# Patient Record
Sex: FEMALE | Employment: OTHER | URBAN - METROPOLITAN AREA
[De-identification: names, ages, dates, MRNs, and addresses within clinical notes are randomized per-mention and may not be internally consistent; named-entity substitution may affect disease eponyms.]

---

## 2024-09-06 ENCOUNTER — APPOINTMENT (OUTPATIENT)
Dept: OBSTETRICS AND GYNECOLOGY | Facility: HOSPITAL | Age: 59
End: 2024-09-06
Payer: MEDICARE

## 2024-10-16 ENCOUNTER — HOSPITAL ENCOUNTER (OUTPATIENT)
Facility: HOSPITAL | Age: 59
Setting detail: OBSERVATION
Discharge: OTHER INSTITUTION | End: 2024-10-17
Attending: EMERGENCY MEDICINE | Admitting: EMERGENCY MEDICINE
Payer: MEDICARE

## 2024-10-16 ENCOUNTER — APPOINTMENT (OUTPATIENT)
Dept: RADIOLOGY | Facility: HOSPITAL | Age: 59
End: 2024-10-16
Payer: MEDICARE

## 2024-10-16 ENCOUNTER — CLINICAL SUPPORT (OUTPATIENT)
Dept: EMERGENCY MEDICINE | Facility: HOSPITAL | Age: 59
End: 2024-10-16
Payer: MEDICARE

## 2024-10-16 DIAGNOSIS — R44.3 HALLUCINATION: Primary | ICD-10-CM

## 2024-10-16 DIAGNOSIS — F91.9 BEHAVIOR DISTURBANCE: ICD-10-CM

## 2024-10-16 PROBLEM — F20.9 SCHIZOPHRENIA: Status: ACTIVE | Noted: 2024-10-16

## 2024-10-16 LAB
ALBUMIN SERPL BCP-MCNC: 4.5 G/DL (ref 3.4–5)
ALP SERPL-CCNC: 105 U/L (ref 33–136)
ALT SERPL W P-5'-P-CCNC: 23 U/L (ref 7–45)
AMPHETAMINES UR QL SCN: NORMAL
ANION GAP SERPL CALC-SCNC: 14 MMOL/L (ref 10–20)
APAP SERPL-MCNC: <10 UG/ML
APPEARANCE UR: CLEAR
AST SERPL W P-5'-P-CCNC: 18 U/L (ref 9–39)
BARBITURATES UR QL SCN: NORMAL
BASOPHILS # BLD AUTO: 0.06 X10*3/UL (ref 0–0.1)
BASOPHILS NFR BLD AUTO: 0.8 %
BENZODIAZ UR QL SCN: NORMAL
BILIRUB SERPL-MCNC: 0.5 MG/DL (ref 0–1.2)
BILIRUB UR STRIP.AUTO-MCNC: NEGATIVE MG/DL
BUN SERPL-MCNC: 12 MG/DL (ref 6–23)
BZE UR QL SCN: NORMAL
CALCIUM SERPL-MCNC: 9.5 MG/DL (ref 8.6–10.6)
CANNABINOIDS UR QL SCN: NORMAL
CHLORIDE SERPL-SCNC: 107 MMOL/L (ref 98–107)
CO2 SERPL-SCNC: 25 MMOL/L (ref 21–32)
COLOR UR: ABNORMAL
CREAT SERPL-MCNC: 0.62 MG/DL (ref 0.5–1.05)
EGFRCR SERPLBLD CKD-EPI 2021: >90 ML/MIN/1.73M*2
EOSINOPHIL # BLD AUTO: 0.16 X10*3/UL (ref 0–0.7)
EOSINOPHIL NFR BLD AUTO: 2.2 %
ERYTHROCYTE [DISTWIDTH] IN BLOOD BY AUTOMATED COUNT: 12.6 % (ref 11.5–14.5)
ETHANOL SERPL-MCNC: <10 MG/DL
FENTANYL+NORFENTANYL UR QL SCN: NORMAL
GLUCOSE BLD MANUAL STRIP-MCNC: 169 MG/DL (ref 74–99)
GLUCOSE SERPL-MCNC: 93 MG/DL (ref 74–99)
GLUCOSE UR STRIP.AUTO-MCNC: NORMAL MG/DL
HCT VFR BLD AUTO: 43.2 % (ref 36–46)
HGB BLD-MCNC: 13.5 G/DL (ref 12–16)
HOLD SPECIMEN: NORMAL
HYALINE CASTS #/AREA URNS AUTO: ABNORMAL /LPF
IMM GRANULOCYTES # BLD AUTO: 0.02 X10*3/UL (ref 0–0.7)
IMM GRANULOCYTES NFR BLD AUTO: 0.3 % (ref 0–0.9)
KETONES UR STRIP.AUTO-MCNC: NEGATIVE MG/DL
LEUKOCYTE ESTERASE UR QL STRIP.AUTO: ABNORMAL
LITHIUM SERPL-SCNC: 0.51 MMOL/L (ref 0.6–1.2)
LYMPHOCYTES # BLD AUTO: 1.45 X10*3/UL (ref 1.2–4.8)
LYMPHOCYTES NFR BLD AUTO: 20.2 %
MAGNESIUM SERPL-MCNC: 1.79 MG/DL (ref 1.6–2.4)
MCH RBC QN AUTO: 29.5 PG (ref 26–34)
MCHC RBC AUTO-ENTMCNC: 31.3 G/DL (ref 32–36)
MCV RBC AUTO: 95 FL (ref 80–100)
METHADONE UR QL SCN: NORMAL
MONOCYTES # BLD AUTO: 0.44 X10*3/UL (ref 0.1–1)
MONOCYTES NFR BLD AUTO: 6.1 %
MUCOUS THREADS #/AREA URNS AUTO: ABNORMAL /LPF
NEUTROPHILS # BLD AUTO: 5.05 X10*3/UL (ref 1.2–7.7)
NEUTROPHILS NFR BLD AUTO: 70.4 %
NITRITE UR QL STRIP.AUTO: NEGATIVE
NRBC BLD-RTO: 0 /100 WBCS (ref 0–0)
OPIATES UR QL SCN: NORMAL
OXYCODONE+OXYMORPHONE UR QL SCN: NORMAL
PCP UR QL SCN: NORMAL
PH UR STRIP.AUTO: 5.5 [PH]
PLATELET # BLD AUTO: 165 X10*3/UL (ref 150–450)
POTASSIUM SERPL-SCNC: 3.7 MMOL/L (ref 3.5–5.3)
PROT SERPL-MCNC: 6.8 G/DL (ref 6.4–8.2)
PROT UR STRIP.AUTO-MCNC: NEGATIVE MG/DL
RBC # BLD AUTO: 4.57 X10*6/UL (ref 4–5.2)
RBC # UR STRIP.AUTO: NEGATIVE /UL
RBC #/AREA URNS AUTO: ABNORMAL /HPF
SALICYLATES SERPL-MCNC: <3 MG/DL
SODIUM SERPL-SCNC: 142 MMOL/L (ref 136–145)
SP GR UR STRIP.AUTO: 1.02
SQUAMOUS #/AREA URNS AUTO: ABNORMAL /HPF
TSH SERPL-ACNC: 3.6 MIU/L (ref 0.44–3.98)
UROBILINOGEN UR STRIP.AUTO-MCNC: NORMAL MG/DL
WBC # BLD AUTO: 7.2 X10*3/UL (ref 4.4–11.3)
WBC #/AREA URNS AUTO: ABNORMAL /HPF

## 2024-10-16 PROCEDURE — 36415 COLL VENOUS BLD VENIPUNCTURE: CPT | Performed by: NURSE PRACTITIONER

## 2024-10-16 PROCEDURE — 83735 ASSAY OF MAGNESIUM: CPT | Performed by: NURSE PRACTITIONER

## 2024-10-16 PROCEDURE — 87086 URINE CULTURE/COLONY COUNT: CPT | Performed by: NURSE PRACTITIONER

## 2024-10-16 PROCEDURE — 80307 DRUG TEST PRSMV CHEM ANLYZR: CPT | Performed by: NURSE PRACTITIONER

## 2024-10-16 PROCEDURE — G0378 HOSPITAL OBSERVATION PER HR: HCPCS

## 2024-10-16 PROCEDURE — 93005 ELECTROCARDIOGRAM TRACING: CPT

## 2024-10-16 PROCEDURE — 93010 ELECTROCARDIOGRAM REPORT: CPT

## 2024-10-16 PROCEDURE — 99285 EMERGENCY DEPT VISIT HI MDM: CPT | Performed by: NURSE PRACTITIONER

## 2024-10-16 PROCEDURE — 71046 X-RAY EXAM CHEST 2 VIEWS: CPT

## 2024-10-16 PROCEDURE — 2500000002 HC RX 250 W HCPCS SELF ADMINISTERED DRUGS (ALT 637 FOR MEDICARE OP, ALT 636 FOR OP/ED): Performed by: NURSE PRACTITIONER

## 2024-10-16 PROCEDURE — 80053 COMPREHEN METABOLIC PANEL: CPT | Performed by: NURSE PRACTITIONER

## 2024-10-16 PROCEDURE — 84443 ASSAY THYROID STIM HORMONE: CPT | Performed by: NURSE PRACTITIONER

## 2024-10-16 PROCEDURE — 85025 COMPLETE CBC W/AUTO DIFF WBC: CPT | Performed by: NURSE PRACTITIONER

## 2024-10-16 PROCEDURE — 82947 ASSAY GLUCOSE BLOOD QUANT: CPT

## 2024-10-16 PROCEDURE — 80178 ASSAY OF LITHIUM: CPT | Performed by: NURSE PRACTITIONER

## 2024-10-16 PROCEDURE — 2500000001 HC RX 250 WO HCPCS SELF ADMINISTERED DRUGS (ALT 637 FOR MEDICARE OP): Performed by: NURSE PRACTITIONER

## 2024-10-16 PROCEDURE — 99285 EMERGENCY DEPT VISIT HI MDM: CPT

## 2024-10-16 PROCEDURE — 70450 CT HEAD/BRAIN W/O DYE: CPT

## 2024-10-16 PROCEDURE — 70450 CT HEAD/BRAIN W/O DYE: CPT | Performed by: RADIOLOGY

## 2024-10-16 PROCEDURE — 71046 X-RAY EXAM CHEST 2 VIEWS: CPT | Performed by: STUDENT IN AN ORGANIZED HEALTH CARE EDUCATION/TRAINING PROGRAM

## 2024-10-16 PROCEDURE — 81001 URINALYSIS AUTO W/SCOPE: CPT | Performed by: NURSE PRACTITIONER

## 2024-10-16 PROCEDURE — 80320 DRUG SCREEN QUANTALCOHOLS: CPT | Performed by: NURSE PRACTITIONER

## 2024-10-16 RX ORDER — ASPIRIN 81 MG/1
81 TABLET ORAL DAILY
COMMUNITY

## 2024-10-16 RX ORDER — BENZTROPINE MESYLATE 1 MG/1
1 TABLET ORAL 2 TIMES DAILY
Status: CANCELLED | OUTPATIENT
Start: 2024-10-16

## 2024-10-16 RX ORDER — SIMVASTATIN 10 MG/1
10 TABLET, FILM COATED ORAL DAILY
COMMUNITY

## 2024-10-16 RX ORDER — BENZTROPINE MESYLATE 1 MG/1
1 TABLET ORAL 2 TIMES DAILY
COMMUNITY

## 2024-10-16 RX ORDER — LEVOTHYROXINE SODIUM 125 UG/1
125 TABLET ORAL DAILY
Status: DISCONTINUED | OUTPATIENT
Start: 2024-10-16 | End: 2024-10-17 | Stop reason: HOSPADM

## 2024-10-16 RX ORDER — LEVOTHYROXINE SODIUM 125 UG/1
125 TABLET ORAL DAILY
COMMUNITY

## 2024-10-16 RX ORDER — LITHIUM CARBONATE 300 MG/1
300 CAPSULE ORAL
COMMUNITY

## 2024-10-16 RX ORDER — OXYBUTYNIN CHLORIDE 10 MG/1
10 TABLET, EXTENDED RELEASE ORAL DAILY
COMMUNITY

## 2024-10-16 RX ORDER — LEVOTHYROXINE SODIUM 125 UG/1
125 TABLET ORAL DAILY
Status: CANCELLED | OUTPATIENT
Start: 2024-10-16

## 2024-10-16 RX ORDER — CALCIUM CARBONATE 300MG(750)
400 TABLET,CHEWABLE ORAL DAILY
COMMUNITY

## 2024-10-16 RX ORDER — OXYBUTYNIN CHLORIDE 5 MG/1
5 TABLET ORAL 2 TIMES DAILY
Status: DISCONTINUED | OUTPATIENT
Start: 2024-10-16 | End: 2024-10-17 | Stop reason: HOSPADM

## 2024-10-16 RX ORDER — CHOLECALCIFEROL (VITAMIN D3) 50 MCG
50 TABLET ORAL DAILY
COMMUNITY

## 2024-10-16 RX ORDER — OMEPRAZOLE 20 MG/1
20 CAPSULE, DELAYED RELEASE ORAL DAILY
COMMUNITY

## 2024-10-16 RX ORDER — OLANZAPINE AND SAMIDORPHAN L-MALATE 10; 10 MG/1; MG/1
1 TABLET, FILM COATED ORAL NIGHTLY
COMMUNITY

## 2024-10-16 RX ORDER — LITHIUM CARBONATE 300 MG/1
300 CAPSULE ORAL 3 TIMES DAILY
Status: DISCONTINUED | OUTPATIENT
Start: 2024-10-16 | End: 2024-10-17 | Stop reason: HOSPADM

## 2024-10-16 RX ORDER — OLANZAPINE 5 MG/1
10 TABLET ORAL NIGHTLY
Status: DISCONTINUED | OUTPATIENT
Start: 2024-10-16 | End: 2024-10-17 | Stop reason: HOSPADM

## 2024-10-16 RX ORDER — NYSTATIN 100000 U/G
CREAM TOPICAL
COMMUNITY

## 2024-10-16 RX ORDER — PANTOPRAZOLE SODIUM 40 MG/1
40 TABLET, DELAYED RELEASE ORAL
Status: DISCONTINUED | OUTPATIENT
Start: 2024-10-17 | End: 2024-10-17 | Stop reason: HOSPADM

## 2024-10-16 RX ORDER — OLANZAPINE 5 MG/1
10 TABLET ORAL NIGHTLY
Status: CANCELLED | OUTPATIENT
Start: 2024-10-16

## 2024-10-16 RX ORDER — BACITRACIN ZINC 500 UNIT/G
OINTMENT (GRAM) TOPICAL 2 TIMES DAILY
COMMUNITY

## 2024-10-16 RX ORDER — HALOPERIDOL DECANOATE 50 MG/ML
100 INJECTION INTRAMUSCULAR
COMMUNITY

## 2024-10-16 RX ORDER — BENZTROPINE MESYLATE 1 MG/1
1 TABLET ORAL 2 TIMES DAILY
Status: DISCONTINUED | OUTPATIENT
Start: 2024-10-16 | End: 2024-10-17 | Stop reason: HOSPADM

## 2024-10-16 SDOH — HEALTH STABILITY: MENTAL HEALTH: DEPRESSION SYMPTOMS: NO PROBLEMS REPORTED OR OBSERVED.

## 2024-10-16 SDOH — HEALTH STABILITY: MENTAL HEALTH: WISH TO BE DEAD (PAST 1 MONTH): NO

## 2024-10-16 SDOH — HEALTH STABILITY: MENTAL HEALTH: SUICIDAL BEHAVIOR (LIFETIME): NO

## 2024-10-16 SDOH — HEALTH STABILITY: MENTAL HEALTH: ANXIETY SYMPTOMS: GENERALIZED

## 2024-10-16 SDOH — ECONOMIC STABILITY: HOUSING INSECURITY: FEELS SAFE LIVING IN HOME: OTHER (COMMENT)

## 2024-10-16 SDOH — HEALTH STABILITY: MENTAL HEALTH: NON-SPECIFIC ACTIVE SUICIDAL THOUGHTS (PAST 1 MONTH): NO

## 2024-10-16 SDOH — ECONOMIC STABILITY: GENERAL

## 2024-10-16 ASSESSMENT — PAIN SCALES - GENERAL
PAINLEVEL_OUTOF10: 0 - NO PAIN

## 2024-10-16 ASSESSMENT — LIFESTYLE VARIABLES
TOTAL SCORE: 0
SUBSTANCE_ABUSE_PAST_12_MONTHS: NO
PRESCIPTION_ABUSE_PAST_12_MONTHS: NO
HAVE YOU EVER FELT YOU SHOULD CUT DOWN ON YOUR DRINKING: NO
HAVE PEOPLE ANNOYED YOU BY CRITICIZING YOUR DRINKING: NO
EVER FELT BAD OR GUILTY ABOUT YOUR DRINKING: NO
EVER HAD A DRINK FIRST THING IN THE MORNING TO STEADY YOUR NERVES TO GET RID OF A HANGOVER: NO

## 2024-10-16 ASSESSMENT — COLUMBIA-SUICIDE SEVERITY RATING SCALE - C-SSRS
1. IN THE PAST MONTH, HAVE YOU WISHED YOU WERE DEAD OR WISHED YOU COULD GO TO SLEEP AND NOT WAKE UP?: NO
2. HAVE YOU ACTUALLY HAD ANY THOUGHTS OF KILLING YOURSELF?: NO
6. HAVE YOU EVER DONE ANYTHING, STARTED TO DO ANYTHING, OR PREPARED TO DO ANYTHING TO END YOUR LIFE?: NO

## 2024-10-16 NOTE — ED PROVIDER NOTES
"HPI   No chief complaint on file.      HPI    This is a 59 year old female with past medical history significant for impaired cognition, falls, bipolar disorder, paranoid schizophrenia, dementia, extrapyramidal side effects from anti-psychotic medications , secondary parkinsonism, hyperlipidemia, hypothyroidism, ESTEFANY, GERD and urinary incontinence presents today from [facility] for \"altered mental status\" that started this past weekend worsened today.   Per the papers from [facility], patient is otherwise alert and has full capacity.   On arrival, she knows her name but cannot tell me where she is and whether she has any complaints.   Per nursing, she was talking to \"sissy\" once I left the room and was having a full conversation.    Spoke with the nurse who had her prior to coming to the ED and per thr , she was found to be talking to the wall, and discussing a pregnancy.   There were multiple safety concerns with her wanting to leave the home in the middle of the night and turning on the stove. She threw a chair and so she was sent to the ED for psychiatric evaluation.       Patient History   No past medical history on file.  No past surgical history on file.  No family history on file.  Social History     Tobacco Use    Smoking status: Not on file    Smokeless tobacco: Not on file   Substance Use Topics    Alcohol use: Not on file    Drug use: Not on file       Physical Exam   ED Triage Vitals   Temp Pulse Resp BP   -- -- -- --      SpO2 Temp src Heart Rate Source Patient Position   -- -- -- --      BP Location FiO2 (%)     -- --       Physical Exam  Constitutional:       General: She is not in acute distress.     Appearance: She is obese. She is not ill-appearing, toxic-appearing or diaphoretic.   Eyes:      Extraocular Movements: Extraocular movements intact.      Pupils: Pupils are equal, round, and reactive to light.   Pulmonary:      Effort: Pulmonary effort is normal.      Breath sounds: " "Normal breath sounds.   Abdominal:      Palpations: Abdomen is soft.   Musculoskeletal:         General: Normal range of motion.      Cervical back: Neck supple.   Skin:     General: Skin is warm and dry.   Neurological:      Comments: Alert to self            ED Course & MDM                  No data recorded                                 Medical Decision Making  This is a 59 year old female with past medical history significant for impaired cognition, falls, bipolar disorder, paranoid schizophrenia, dementia, extrapyramidal side effects from anti-psychotic medications , secondary parkinsonism, hyperlipidemia, hypothyroidism, ESTEFANY, GERD and urinary incontinence presents today from [facility] for \"altered mental status\" that started this past weekend worsened today.   Differential Dx- exacerbation of her psychiatric issues, electrolyte imbalances, UTI, CVA, infectious process  ECG showed HR of 91, WI interval of 136 with no axis deviation and no acute ischemic changes       Labs showed no significant abnormalities with UA positive for Leukocyte estrace with no subjective urinary symptoms. CMP had to be re-drawn and sent. Currently awaiting the results.   The patient was seen by EPAT and they recommended inpatient psychiatric admission. H&P was completed. I have not needed to medicate the patient. Med Rec was completed and her home medication ordered. I will order her Lithium once her level is resulted.   The patient was staffed withkellen Petersen.     Procedure  Procedures     YOANDY Mendoza, ISAI  10/16/24 1811       YOANDY Mendoza, ISAI  10/16/24 1812    "

## 2024-10-16 NOTE — ED TRIAGE NOTES
Pt reports to the Ed via [facility] from [facility] for complaints of altered mental status that started over the weekend per staff. Pt is unable to give an accurate history of why she is here in the hospital. Pt became worse today so the facility called. Pt states she has a history of schizophrenia but is unable to voice any other PMHX

## 2024-10-16 NOTE — H&P
"Observation History and Physical  [facility] EMERGENCY MEDICINE           History of Present Illness     History provided by: Nursing Staff  Limitations to History: Mental Illness  External Records Reviewed: papers from [place]       Patient History:  Anna Verma is a 59 y.o. female who presented to the emergency department with disorganized thought process and concern for disturbing behavior at her group home     Anna Verma was escalated to observation status. Observation was necessary as they continue to require treatment and monitoring of their psychiatric illness while awaiting inpatient behavioral health bed availability.    Physical Exam     Visit Vitals  BP (!) 139/94 (BP Location: Right arm, Patient Position: Lying)   Pulse (!) 104   Temp 36.9 °C (98.4 °F) (Tympanic)   Resp 16   Ht 1.575 m (5' 2\")   Wt 81.6 kg (180 lb)   SpO2 98%   BMI 32.92 kg/m²   BSA 1.89 m²       GENERAL:  The patient appears nourished and normally developed. Vital signs as documented.     PULMONARY:  Without any respiratory distress. Able to speak full sentences, no accessory muscle use    CARDIAC: Warm and well perfused. No cyanosis.    MUSCULOSKELETAL:   Able to ambulate, Non edematous, with no obvious deformities.     SKIN: No pallor. Intact.    NEURO:  No obvious neurological deficits.  Able to follow commands.    Psych: patient has been cooperative. I have not needed to medicate her       Impression and Plan           Anna Verma under observation status in [facility] EMERGENCY MEDICINE for psychiatric illness monitoring and treatment while awaiting inpatient behavioral health bed availability.   Emergency Psychiatric Assessment Team has been consulted. Case discussed with them and decision for inpatient hospitalization deemed necessary. Patient is medically clear at this time.     Home medication reconciliation was reviewed and restarted where clinically indicated.     Patient and Family " updated on plan of care.     Tash Lindsey, APRN-CNP, DNP

## 2024-10-16 NOTE — PROGRESS NOTES
Pharmacy Medication History Review    Anna Verma is a 59 y.o. female admitted for Schizophrenia. Pharmacy reviewed the patient's vymeu-fw-qznuzwris medications and allergies for accuracy.    Medications ADDED:  all  Medications CHANGED:  none  Medications REMOVED:   none     The list below reflects the updated PTA list.   Prior to Admission Medications   Prescriptions Last Dose Informant   aspirin 81 mg EC tablet  Other, Care Giver   Sig: Take 1 tablet (81 mg) by mouth once daily.   bacitracin 500 unit/gram ointment  Other, Care Giver   Sig: Apply topically 2 times a day. 1 application to left knee skin 2 times a day until healed   benztropine (Cogentin) 1 mg tablet  Other, Care Giver   Sig: Take 1 tablet (1 mg) by mouth 2 times a day.   cholecalciferol (Vitamin D-3) 50 MCG (2000 UT) tablet  Other, Care Giver   Sig: Take 1 tablet (50 mcg) by mouth once daily.   haloperidol decanoate (Haldol Decanoate) 50 mg/mL injection  Other, Care Giver   Sig: Inject 2 mL (100 mg) into the muscle every 28 (twenty-eight) days.   levothyroxine (Synthroid, Levoxyl) 125 mcg tablet  Other, Care Giver   Sig: Take 1 tablet (125 mcg) by mouth early in the morning.. Take on an empty stomach at the same time each day, either 30 to 60 minutes prior to breakfast   lithium 300 mg capsule  Other, Care Giver   Sig: Take 1 capsule (300 mg) by mouth 3 times daily (morning, midday, late afternoon).   magnesium oxide (Mag-Ox) 400 mg tablet  Other, Care Giver   Sig: Take 1 tablet (400 mg) by mouth once daily.   nystatin (Mycostatin) cream  Other, Care Giver   Sig: Apply topically to groin skin 3 times a day   olanzapine-samidorphan (Lybalvi) 10-10 mg tablet  Other, Care Giver   Sig: Take 1 tablet by mouth once daily at bedtime.   omeprazole (PriLOSEC) 20 mg DR capsule  Other, Care Giver   Sig: Take 1 capsule (20 mg) by mouth once daily. Do not crush or chew.   oxybutynin XL (Ditropan-XL) 10 mg 24 hr tablet  Other, Care Giver   Sig: Take 1 tablet  "(10 mg) by mouth once daily. Do not crush, chew, or split.   simvastatin (Zocor) 10 mg tablet  Other, Care Giver   Sig: Take 1 tablet (10 mg) by mouth once daily.      Facility-Administered Medications: None        The list below reflects the updated allergy list. Please review each documented allergy for additional clarification and justification.  Allergies  Reviewed by Macario Boo IV RN on 10/16/2024   No Known Allergies         Patient accepts M2B at discharge.     Sources:   out patient fill history, OARRS, and patient interview who was unable to be along with medication list from [place] where she lives.  Try to call to obtain  last doses but were unable to find out from facility.     Additional Comments:  none      Yo [facility]  Transitions of Care Pharmacist  10/16/24     Secure Chat preferred   If no response call [phone] or [email or web address] \"Med Rec\"   "

## 2024-10-16 NOTE — PROGRESS NOTES
EPAT - Social Work Psychiatric Assessment    Arrival Details  Mode of Arrival: Ambulance  Admission Source:  ()  Admission Type: Involuntary  EPAT Assessment Start Date: 10/16/24  EPAT Assessment Start Time: 1600  Name of : BRANT Ruiz LSW    History of Present Illness  Admission Reason: Psychiatric Evaluation  HPI: Patient is a 60yo female presenting to the ED via [facility] with chief complaint of altered mental status. Reportedly, “complaints of altered mental status that started over the weekend per staff. Pt is unable to give an accurate history of why she is here in the hospital. Pt became worse today so the facility called”. Patient’s chart, triage, and provider note reviewed prior to assessment. Patient has a psychiatric history of Schizophrenia and currently resides in a group home facilitated by [facility] [place]. Patient has a number of previous inpatient admissions, most recently appears to be 8/14-8/27/21 at [facility]. History otherwise limited 2/2 patient presentation and inability to reach staff at time of assessment. No risk indicated at triage, UTOX negative, BAL not available.    SW Readmission Information   Readmission within 30 Days: No    Psychiatric Symptoms  Anxiety Symptoms: Generalized  Depression Symptoms: No problems reported or observed.  Dari Symptoms: Less need to sleep    Psychosis Symptoms  Hallucination Type: Auditory, Visual  Delusion Type: Paranoid    Additional Symptoms - Adult  Generalized Anxiety Disorder: Difficult to control worry, Difficulity concentrating, Excessive anxiety/worry, Restlessness  Obsessive Compulsive Disorder: No problems reported or observed.  Panic Attack: No problems reported or observed.  Post Traumatic Stress Disorder: No problems reported or observed.  Delirium: No problems reported or observed.    Past Psychiatric History/Meds/Treatments  Past Psychiatric History: Psychiatric Diagnosis: Schizophrenia // Current MH [facility]: [facility] //  Previous Admissions: numerous, most recently [facility] 8/2021  Past Psychiatric Meds/Treatments: see med list  Past Violence/Victimization History: Denies    Current Mental Health Contacts   Name/Phone Number: [facility]   Last Appointment Date: daily  Provider Name/Phone Number: [facility]  Provider Last Appointment Date: unkn    Support System:  (Unable to assess)    Living Arrangement: House, Lives with someone    Home Safety  Feels Safe Living in Home: Other (Comment) (Unable to assess)    Income Information  Employment Status for: Patient  Employment Status: Disabled  Income Source: Disability  Financial Concerns: None    [employer]/Education History  Current or Previous  Service: None  Education Level:  (Unable to assess)    Social/Cultural History  Social History: [place] citizen: Yes // Payee: none // Guardian/POA: pt reports self  Cultural Requests During Hospitalization: none  Spiritual Requests During Hospitalization: none  Important Activities:  (Unable to assess)    Legal  Legal Considerations: Patient/ Family Capacity to Make Sound Judgments  Criminal Activity/ Legal Involvement Pertinent to Current Situation/ Hospitalization: None    Drug Screening  Have you used any substances (canabis, cocaine, heroin, hallucinogens, inhalants, etc.) in the past 12 months?: No  Have you used any prescription drugs other than prescribed in the past 12 months?: No  Is a toxicology screen needed?: Yes    Stage of Change  Stage of Change:  (n/a)    Behavioral Health  Behavioral Health(WDL): Exceptions to WDL  Behaviors/Mood: Anxious, Cooperative, Hallucinations, Pleasant  Affect: Appropriate to circumstances    Orientation  Orientation Level: Disoriented to situation, Disoriented to place    General Appearance  Motor Activity: Restlessness  Speech Pattern: Rapid, Repetitive, Word salad  General Attitude: Cooperative, Pleasant  Appearance/Hygiene: Disheveled    Thought Process  Coherency:  Disorganized, Incoherent  Content: Unable to assess  Delusions:  (Unable to assess)  Perception: Hallucinations  Hallucination: Auditory, Visual  Judgment/Insight: Impaired  Confusion: None  Cognition: Poor judgement, Poor safety awareness, Appropriate attention/concentration, Follows commands    Sleep Pattern  Sleep Pattern: Insomnia    Risk Factors  Self Harm/Suicidal Ideation Plan: Denies  Previous Self Harm/Suicidal Plans: Denies  Risk Factors: Major mental illness    Violence Risk Assessment  Assessment of Violence: None noted  Thoughts of Harm to Others: No    Ability to Assess Risk Screen  Risk Screen - Ability to Assess: Able to be screened  [place] Suicide Severity Rating Scale (Screener/Recent Self-Report)  1. Wish to be Dead (Past 1 Month): No  2. Non-Specific Active Suicidal Thoughts (Past 1 Month): No  6. Suicidal Behavior (Lifetime): No  Calculated C-SSRS Risk Score (Lifetime/Recent): No Risk Indicated  Step 1: Risk Factors  Current & Past Psychiatric Dx: Psychotic disorder  Presenting Symptoms: Psychosis  Precipitants/Stressors: Inadequate social supports  Change in Treatment: Hopeless or dissatisfied with provider or treatment  Access to Lethal Methods : No  Step 2: Protective Factors   Protective Factors Internal: Frustration tolerance, Identifies reasons for living  Protective Factors External: Cultural, spiritual and/or moral attitudes against suicide, Supportive social network or family or friends  Step 3: Suicidal Ideation Intensity  How Many Times Have You Had These Thoughts: Less than once a week  When You Have the Thoughts How Long do They Last : Fleeting - few seconds or minutes  Could/Can You Stop Thinking About Killing Yourself or Wanting to Die if You Want to: Does not attempt to control thoughts  Are There Things - Anyone or Anything - That Stopped You From Wanting to Die or Acting on: Does not apply  What Sort of Reasons Did You Have For Thinking About Wanting to Die or Killing  Yourself: Does not apply  Total Score: 2  Step 5: Documentation  Risk Level: Low suicide risk (Patient remained low acute risk to self. Discussed with YAONDY Powers)    Psychiatric Impression and Plan of Care  Assessment and Plan:     Patient was initially encountered cheerfully speaking to herself in her room alone. Upon assessment the patient remained calm, pleasant, and as cooperative as possible given current presentation. The patient presented with disorganized, nonsensical speech and appeared to be responding to internal stimuli throughout. Patient was actively responding to the television in her room despite it not being on at time of interview. She denied current SI/HI but otherwise was largely unable to participate in a productive manner. Patient was able to report that she has not been sleeping for at least the past week but has been taking medication for Schizophrenia. Otherwise, patient remained fixated on various physical ailments, events that occurred many years ago, and requested a “motorolla car come vroom vroom, pick me up!”. Multiple attempts were made to contact patient's care team at [facility], however, no call back at time of assessment. Per ED staff reports, patient has been decompensating psychiatrically since this weekend and  staff had expressed desire for inpatient stabilization prior to return to their facility.     Diagnostic Impression: Schizophrenia     Psychiatric Impression and Plan for Care: Patient presents as gravely disabled by psychiatric symptoms and demonstrates an abrupt decompensation/departure from baseline. Recommendation for admission discussed with YOANDY Powers who is in agreement.     Specific Resources Provided to Patient: Admission    Outcome/Disposition  Patient's Perception of Outcome Achieved: Unable to assess  Assessment, Recommendations and Risk Level Reviewed with: YOANDY Powers  Contact Name: [facility]  Contact  Number(s): [phone]  Contact Relationship:   EPAT Assessment Completed Date: 10/16/24  EPAT Assessment Completed Time: 5672

## 2024-10-17 VITALS
RESPIRATION RATE: 16 BRPM | DIASTOLIC BLOOD PRESSURE: 84 MMHG | SYSTOLIC BLOOD PRESSURE: 123 MMHG | BODY MASS INDEX: 33.13 KG/M2 | HEIGHT: 62 IN | TEMPERATURE: 98.4 F | OXYGEN SATURATION: 92 % | HEART RATE: 94 BPM | WEIGHT: 180 LBS

## 2024-10-17 LAB
ATRIAL RATE: 91 BPM
BACTERIA UR CULT: NO GROWTH
P AXIS: 23 DEGREES
P OFFSET: 201 MS
P ONSET: 150 MS
PR INTERVAL: 136 MS
Q ONSET: 218 MS
QRS COUNT: 15 BEATS
QRS DURATION: 88 MS
QT INTERVAL: 316 MS
QTC CALCULATION(BAZETT): 388 MS
QTC FREDERICIA: 363 MS
R AXIS: 33 DEGREES
T AXIS: 119 DEGREES
T OFFSET: 376 MS
VENTRICULAR RATE: 91 BPM

## 2024-10-17 PROCEDURE — 2500000002 HC RX 250 W HCPCS SELF ADMINISTERED DRUGS (ALT 637 FOR MEDICARE OP, ALT 636 FOR OP/ED): Performed by: NURSE PRACTITIONER

## 2024-10-17 PROCEDURE — 2500000001 HC RX 250 WO HCPCS SELF ADMINISTERED DRUGS (ALT 637 FOR MEDICARE OP): Performed by: NURSE PRACTITIONER

## 2024-10-17 PROCEDURE — G0378 HOSPITAL OBSERVATION PER HR: HCPCS

## 2024-10-17 NOTE — DISCHARGE SUMMARY
"Observation Disposition Note  [facility] EMERGENCY MEDICINE             Subjective:       Anna Verma has been under observation status in [facility] EMERGENCY MEDICINE for psychiatric illness monitoring and treatment while awaiting inpatient behavioral health bed availability.       Physical Exam     Visit Vitals  /84 (BP Location: Right arm, Patient Position: Lying)   Pulse 94   Temp 36.9 °C (98.4 °F) (Tympanic)   Resp 16   Ht 1.575 m (5' 2\")   Wt 81.6 kg (180 lb)   SpO2 (!) 92%   BMI 32.92 kg/m²   Smoking Status Unknown   BSA 1.89 m²       GENERAL:  The patient appears nourished and normally developed. Vital signs as documented.     PULMONARY:  Without any respiratory distress. Able to speak full sentences, no accessory muscle use    CARDIAC: Warm and well perfused. No cyanosis.    MUSCULOSKELETAL:   Able to ambulate, Non edematous, with no obvious deformities.     SKIN: No pallor. Intact.    NEURO:  No obvious neurological deficits.  Able to follow commands.    Psych: alert and coooperative      Impresssion and Plan     Diagnoses as of 10/17/24 1342   Hallucination   Behavior disturbance       In summary, Anna Verma has been cared under observation in [facility] for psychiatric illness monitoring and treatment while awaiting inpatient behavioral health bed availability.     Emergency Psychiatric Assessment Team was consulted. Case discussed with them and decision for inpatient hospitalization deemed necessary.     Patient has been accepted at  [facility]    Total length of observation was 25 hours. Dr. Ramirez att. providers found is the disposition attending.    Discharge Diagnosis  Schizophrenia    Saúl Hansen, DO   "

## 2024-10-17 NOTE — SIGNIFICANT EVENT
Application for Emergency Admission      Ready for Transfer?  Is the patient medically cleared for transfer to inpatient psychiatry: Yes  Has the patient been accepted to an inpatient psychiatric hospital: Yes    Application for Emergency Admission  IN ACCORDANCE WITH [place]122[place]10 [place]  The Chief Clinical Officer of: [facility] 10/16/2024 .10:48 PM    Reason for Hospitalization  The undersigned has reason to believe that: Anna Verma Is a mentally ill person subject to hospitalization by court order under division B Section 5122.01 of the Revised Code, i.e., this person:    1.Yes  Represents a substantial risk of physical harm to self as manifested by evidence of threats of, or attempts at, suicide or serious self-inflicted bodily harm    2.Yes Represents a substantial risk of physical harm to others as manifested by evidence of recent homicidal or other violent behavior, evidence of recent threats that place another in reasonable fear of violent behavior and serious physical harm, or other evidence of present dangerousness    3.Yes Represents a substantial and immediate risk of serious physical impairment or injury to self as manifested by  evidence that the person is unable to provide for and is not providing for the person's basic physical needs because of the person's mental illness and that appropriate provision for those needs cannot be made  immediately available in the community    4.Yes Would benefit from treatment in a hospital for his mental illness and is in need of such treatment as manifested by evidence of behavior that creates a grave and imminent risk to substantial rights of others or  himself.    5.Yes Would benefit from treatment as manifested by evidence of behavior that indicates all of the following:       (a) The person is unlikely to survive safely in the community without supervision, based on a clinical determination.       (b) The person has a history of lack of compliance with  treatment for mental illness and one of the following applies:      (i) At least twice within the thirty-six months prior to the filing of an affidavit seeking court-ordered treatment of the person under section 5122.111 of the Revised Code, the lack of compliance has been a significant factor in necessitating hospitalization in a hospital or receipt of services in a forensic or other mental health unit of a correctional facility, provided that the thirty-six-month period shall be extended by the length of any hospitalization or incarceration of the person that occurred within the thirty-six-month period.      (ii) Within the forty-eight months prior to the filing of an affidavit seeking court-ordered treatment of the person under section 5122.111 of the Revised Code, the lack of compliance resulted in one or more acts of serious violent behavior toward self or others or threats of, or attempts at, serious physical harm to self or others, provided that the forty-eight-month period shall be extended by the length of any hospitalization or incarceration of the person that occurred within the forty-eight-month period.      (c) The person, as a result of mental illness, is unlikely to voluntarily participate in necessary treatment.       (d) In view of the person's treatment history and current behavior, the person is in need of treatment in order to prevent a relapse or deterioration that would be likely to result in substantial risk of serious harm to the person or others.    (e) Represents a substantial risk of physical harm to self or others if allowed to remain at liberty pending examination.    Therefore, it is requested that said person be admitted to the above named facility.    STATEMENT OF BELIEF    Must be filled out by one of the following: a psychiatrist, licensed physician, licensed clinical psychologist, health or ,  or .  (Statement shall include the circumstances under  which the individual was taken into custody and the reason for the person's belief that hospitalization is necessary. The statement shall also include a reference to efforts made to secure the individual's property at his residence if he was taken into custody there. Every reasonable and appropriate effort should be made to take this person into custody in the least conspicuous manner possible.)    This is a 59 year old female who presented to the ED with threatening behavior over the weekend at her group home and talking to the wall. She was seen by EPAT who recommended inpatient psychiatric admission.     YOANDY Mendoza, DNP 10/16/2024     _____________________________________________________________   Place of Employment: [employer]    STATEMENT OF OBSERVATION BY PSYCHIATRIST, LICENSED PHYSICIAN, OR LICENSED CLINICAL PSYCHOLOGIST, IF APPLICABLE    Place of Observation (e.g., [facility] center, general hospital, office, emergency facility)  (If applicable, please complete)    YOANDY Mendoza, DNP 10/16/2024    _____________________________________________________________

## 2024-10-31 ENCOUNTER — CLINICAL SUPPORT (OUTPATIENT)
Dept: EMERGENCY MEDICINE | Facility: HOSPITAL | Age: 59
End: 2024-10-31
Payer: MEDICARE

## 2024-10-31 ENCOUNTER — HOSPITAL ENCOUNTER (EMERGENCY)
Facility: HOSPITAL | Age: 59
Discharge: OTHER INSTITUTION | End: 2024-11-01
Attending: EMERGENCY MEDICINE
Payer: MEDICARE

## 2024-10-31 VITALS
RESPIRATION RATE: 18 BRPM | DIASTOLIC BLOOD PRESSURE: 80 MMHG | TEMPERATURE: 97.9 F | SYSTOLIC BLOOD PRESSURE: 130 MMHG | HEART RATE: 96 BPM | OXYGEN SATURATION: 96 %

## 2024-10-31 DIAGNOSIS — R44.0 AUDITORY HALLUCINATION: Primary | ICD-10-CM

## 2024-10-31 LAB
ALBUMIN SERPL BCP-MCNC: 4.1 G/DL (ref 3.4–5)
ALP SERPL-CCNC: 89 U/L (ref 33–136)
ALT SERPL W P-5'-P-CCNC: 27 U/L (ref 7–45)
AMPHETAMINES UR QL SCN: NORMAL
ANION GAP BLDV CALCULATED.4IONS-SCNC: 7 MMOL/L (ref 10–25)
ANION GAP SERPL CALC-SCNC: 15 MMOL/L (ref 10–20)
APAP SERPL-MCNC: <10 UG/ML
AST SERPL W P-5'-P-CCNC: 22 U/L (ref 9–39)
BARBITURATES UR QL SCN: NORMAL
BASE EXCESS BLDV CALC-SCNC: 3.2 MMOL/L (ref -2–3)
BASOPHILS # BLD AUTO: 0.05 X10*3/UL (ref 0–0.1)
BASOPHILS NFR BLD AUTO: 0.7 %
BENZODIAZ UR QL SCN: NORMAL
BILIRUB SERPL-MCNC: 0.6 MG/DL (ref 0–1.2)
BODY TEMPERATURE: 37 DEGREES CELSIUS
BUN SERPL-MCNC: 12 MG/DL (ref 6–23)
BZE UR QL SCN: NORMAL
CA-I BLDV-SCNC: 1.2 MMOL/L (ref 1.1–1.33)
CALCIUM SERPL-MCNC: 8.6 MG/DL (ref 8.6–10.6)
CANNABINOIDS UR QL SCN: NORMAL
CHLORIDE BLDV-SCNC: 106 MMOL/L (ref 98–107)
CHLORIDE SERPL-SCNC: 106 MMOL/L (ref 98–107)
CO2 SERPL-SCNC: 25 MMOL/L (ref 21–32)
CREAT SERPL-MCNC: 0.61 MG/DL (ref 0.5–1.05)
EGFRCR SERPLBLD CKD-EPI 2021: >90 ML/MIN/1.73M*2
EOSINOPHIL # BLD AUTO: 0.16 X10*3/UL (ref 0–0.7)
EOSINOPHIL NFR BLD AUTO: 2.2 %
ERYTHROCYTE [DISTWIDTH] IN BLOOD BY AUTOMATED COUNT: 13.2 % (ref 11.5–14.5)
ETHANOL SERPL-MCNC: <10 MG/DL
FENTANYL+NORFENTANYL UR QL SCN: NORMAL
GLUCOSE BLDV-MCNC: 145 MG/DL (ref 74–99)
GLUCOSE SERPL-MCNC: 143 MG/DL (ref 74–99)
HCO3 BLDV-SCNC: 28.5 MMOL/L (ref 22–26)
HCT VFR BLD AUTO: 39.3 % (ref 36–46)
HCT VFR BLD EST: 39 % (ref 36–46)
HGB BLD-MCNC: 12.6 G/DL (ref 12–16)
HGB BLDV-MCNC: 13.1 G/DL (ref 12–16)
IMM GRANULOCYTES # BLD AUTO: 0.05 X10*3/UL (ref 0–0.7)
IMM GRANULOCYTES NFR BLD AUTO: 0.7 % (ref 0–0.9)
LACTATE BLDV-SCNC: 0.8 MMOL/L (ref 0.4–2)
LITHIUM SERPL-SCNC: 0.43 MMOL/L (ref 0.6–1.2)
LYMPHOCYTES # BLD AUTO: 1.4 X10*3/UL (ref 1.2–4.8)
LYMPHOCYTES NFR BLD AUTO: 18.9 %
MCH RBC QN AUTO: 29.9 PG (ref 26–34)
MCHC RBC AUTO-ENTMCNC: 32.1 G/DL (ref 32–36)
MCV RBC AUTO: 93 FL (ref 80–100)
METHADONE UR QL SCN: NORMAL
MONOCYTES # BLD AUTO: 0.5 X10*3/UL (ref 0.1–1)
MONOCYTES NFR BLD AUTO: 6.7 %
NEUTROPHILS # BLD AUTO: 5.26 X10*3/UL (ref 1.2–7.7)
NEUTROPHILS NFR BLD AUTO: 70.8 %
NRBC BLD-RTO: 0 /100 WBCS (ref 0–0)
OPIATES UR QL SCN: NORMAL
OXYCODONE+OXYMORPHONE UR QL SCN: NORMAL
OXYHGB MFR BLDV: 89.9 % (ref 45–75)
PCO2 BLDV: 45 MM HG (ref 41–51)
PCP UR QL SCN: NORMAL
PH BLDV: 7.41 PH (ref 7.33–7.43)
PLATELET # BLD AUTO: 168 X10*3/UL (ref 150–450)
PO2 BLDV: 63 MM HG (ref 35–45)
POTASSIUM BLDV-SCNC: 3.5 MMOL/L (ref 3.5–5.3)
POTASSIUM SERPL-SCNC: 3.7 MMOL/L (ref 3.5–5.3)
PROT SERPL-MCNC: 6.2 G/DL (ref 6.4–8.2)
RBC # BLD AUTO: 4.22 X10*6/UL (ref 4–5.2)
SALICYLATES SERPL-MCNC: <3 MG/DL
SAO2 % BLDV: 92 % (ref 45–75)
SODIUM BLDV-SCNC: 138 MMOL/L (ref 136–145)
SODIUM SERPL-SCNC: 142 MMOL/L (ref 136–145)
WBC # BLD AUTO: 7.4 X10*3/UL (ref 4.4–11.3)

## 2024-10-31 PROCEDURE — 80143 DRUG ASSAY ACETAMINOPHEN: CPT

## 2024-10-31 PROCEDURE — 84132 ASSAY OF SERUM POTASSIUM: CPT | Mod: 59

## 2024-10-31 PROCEDURE — 80307 DRUG TEST PRSMV CHEM ANLYZR: CPT

## 2024-10-31 PROCEDURE — 85025 COMPLETE CBC W/AUTO DIFF WBC: CPT

## 2024-10-31 PROCEDURE — 99285 EMERGENCY DEPT VISIT HI MDM: CPT

## 2024-10-31 PROCEDURE — 93010 ELECTROCARDIOGRAM REPORT: CPT

## 2024-10-31 PROCEDURE — 84075 ASSAY ALKALINE PHOSPHATASE: CPT

## 2024-10-31 PROCEDURE — 80178 ASSAY OF LITHIUM: CPT

## 2024-10-31 PROCEDURE — 99285 EMERGENCY DEPT VISIT HI MDM: CPT | Performed by: EMERGENCY MEDICINE

## 2024-10-31 PROCEDURE — 93005 ELECTROCARDIOGRAM TRACING: CPT

## 2024-10-31 PROCEDURE — 84132 ASSAY OF SERUM POTASSIUM: CPT

## 2024-10-31 PROCEDURE — 36415 COLL VENOUS BLD VENIPUNCTURE: CPT

## 2024-10-31 SDOH — HEALTH STABILITY: MENTAL HEALTH

## 2024-10-31 SDOH — ECONOMIC STABILITY: GENERAL

## 2024-10-31 SDOH — ECONOMIC STABILITY: HOUSING INSECURITY: FEELS SAFE LIVING IN HOME: YES

## 2024-10-31 ASSESSMENT — COLUMBIA-SUICIDE SEVERITY RATING SCALE - C-SSRS
2. HAVE YOU ACTUALLY HAD ANY THOUGHTS OF KILLING YOURSELF?: NO
6. HAVE YOU EVER DONE ANYTHING, STARTED TO DO ANYTHING, OR PREPARED TO DO ANYTHING TO END YOUR LIFE?: NO
1. IN THE PAST MONTH, HAVE YOU WISHED YOU WERE DEAD OR WISHED YOU COULD GO TO SLEEP AND NOT WAKE UP?: NO

## 2024-10-31 ASSESSMENT — LIFESTYLE VARIABLES
SUBSTANCE_ABUSE_PAST_12_MONTHS: NO
PRESCIPTION_ABUSE_PAST_12_MONTHS: NO

## 2024-10-31 ASSESSMENT — PAIN DESCRIPTION - PROGRESSION: CLINICAL_PROGRESSION: NOT CHANGED

## 2024-10-31 ASSESSMENT — PAIN - FUNCTIONAL ASSESSMENT: PAIN_FUNCTIONAL_ASSESSMENT: 0-10

## 2024-10-31 ASSESSMENT — PAIN SCALES - GENERAL: PAINLEVEL_OUTOF10: 0 - NO PAIN

## 2024-11-01 LAB
ATRIAL RATE: 81 BPM
P AXIS: 51 DEGREES
P OFFSET: 204 MS
P ONSET: 149 MS
PR INTERVAL: 148 MS
Q ONSET: 223 MS
QRS COUNT: 14 BEATS
QRS DURATION: 88 MS
QT INTERVAL: 390 MS
QTC CALCULATION(BAZETT): 453 MS
QTC FREDERICIA: 430 MS
R AXIS: 36 DEGREES
T AXIS: 76 DEGREES
T OFFSET: 418 MS
VENTRICULAR RATE: 81 BPM

## 2025-02-24 ENCOUNTER — APPOINTMENT (OUTPATIENT)
Dept: RADIOLOGY | Facility: HOSPITAL | Age: 60
End: 2025-02-24
Payer: MEDICARE

## 2025-02-24 ENCOUNTER — HOSPITAL ENCOUNTER (EMERGENCY)
Facility: HOSPITAL | Age: 60
Discharge: HOME | End: 2025-02-24
Attending: EMERGENCY MEDICINE
Payer: MEDICARE

## 2025-02-24 VITALS
TEMPERATURE: 99.4 F | HEART RATE: 80 BPM | BODY MASS INDEX: 33.13 KG/M2 | OXYGEN SATURATION: 98 % | HEIGHT: 62 IN | WEIGHT: 180 LBS | DIASTOLIC BLOOD PRESSURE: 68 MMHG | SYSTOLIC BLOOD PRESSURE: 103 MMHG | RESPIRATION RATE: 16 BRPM

## 2025-02-24 DIAGNOSIS — U07.1 COVID: ICD-10-CM

## 2025-02-24 DIAGNOSIS — R06.02 SHORTNESS OF BREATH: Primary | ICD-10-CM

## 2025-02-24 DIAGNOSIS — I72.9 ANEURYSM (CMS-HCC): ICD-10-CM

## 2025-02-24 LAB
ALBUMIN SERPL BCP-MCNC: 3.8 G/DL (ref 3.4–5)
ALP SERPL-CCNC: 101 U/L (ref 33–136)
ALT SERPL W P-5'-P-CCNC: 17 U/L (ref 7–45)
ANION GAP SERPL CALC-SCNC: 12 MMOL/L (ref 10–20)
APTT PPP: 27 SECONDS (ref 27–38)
AST SERPL W P-5'-P-CCNC: 10 U/L (ref 9–39)
BASOPHILS # BLD AUTO: 0.03 X10*3/UL (ref 0–0.1)
BASOPHILS NFR BLD AUTO: 0.4 %
BILIRUB SERPL-MCNC: 0.4 MG/DL (ref 0–1.2)
BNP SERPL-MCNC: 12 PG/ML (ref 0–99)
BUN SERPL-MCNC: 11 MG/DL (ref 6–23)
CALCIUM SERPL-MCNC: 8.8 MG/DL (ref 8.6–10.6)
CARDIAC TROPONIN I PNL SERPL HS: <3 NG/L (ref 0–34)
CARDIAC TROPONIN I PNL SERPL HS: <3 NG/L (ref 0–34)
CHLORIDE SERPL-SCNC: 103 MMOL/L (ref 98–107)
CO2 SERPL-SCNC: 28 MMOL/L (ref 21–32)
CREAT SERPL-MCNC: 0.47 MG/DL (ref 0.5–1.05)
EGFRCR SERPLBLD CKD-EPI 2021: >90 ML/MIN/1.73M*2
EOSINOPHIL # BLD AUTO: 0.06 X10*3/UL (ref 0–0.7)
EOSINOPHIL NFR BLD AUTO: 0.8 %
ERYTHROCYTE [DISTWIDTH] IN BLOOD BY AUTOMATED COUNT: 13.2 % (ref 11.5–14.5)
FLUAV RNA RESP QL NAA+PROBE: NOT DETECTED
FLUBV RNA RESP QL NAA+PROBE: NOT DETECTED
GLUCOSE SERPL-MCNC: 113 MG/DL (ref 74–99)
HCT VFR BLD AUTO: 37.3 % (ref 36–46)
HGB BLD-MCNC: 12 G/DL (ref 12–16)
HOLD SPECIMEN: NORMAL
HOLD SPECIMEN: NORMAL
IMM GRANULOCYTES # BLD AUTO: 0.04 X10*3/UL (ref 0–0.7)
IMM GRANULOCYTES NFR BLD AUTO: 0.5 % (ref 0–0.9)
LYMPHOCYTES # BLD AUTO: 0.71 X10*3/UL (ref 1.2–4.8)
LYMPHOCYTES NFR BLD AUTO: 9.6 %
MAGNESIUM SERPL-MCNC: 1.48 MG/DL (ref 1.6–2.4)
MCH RBC QN AUTO: 28.4 PG (ref 26–34)
MCHC RBC AUTO-ENTMCNC: 32.2 G/DL (ref 32–36)
MCV RBC AUTO: 88 FL (ref 80–100)
MONOCYTES # BLD AUTO: 0.74 X10*3/UL (ref 0.1–1)
MONOCYTES NFR BLD AUTO: 10 %
NEUTROPHILS # BLD AUTO: 5.8 X10*3/UL (ref 1.2–7.7)
NEUTROPHILS NFR BLD AUTO: 78.7 %
NRBC BLD-RTO: 0 /100 WBCS (ref 0–0)
PLATELET # BLD AUTO: 112 X10*3/UL (ref 150–450)
POTASSIUM SERPL-SCNC: 3.9 MMOL/L (ref 3.5–5.3)
PROT SERPL-MCNC: 6.1 G/DL (ref 6.4–8.2)
RBC # BLD AUTO: 4.22 X10*6/UL (ref 4–5.2)
RSV RNA RESP QL NAA+PROBE: NOT DETECTED
SARS-COV-2 RNA RESP QL NAA+PROBE: DETECTED
SODIUM SERPL-SCNC: 139 MMOL/L (ref 136–145)
WBC # BLD AUTO: 7.4 X10*3/UL (ref 4.4–11.3)

## 2025-02-24 PROCEDURE — 87637 SARSCOV2&INF A&B&RSV AMP PRB: CPT

## 2025-02-24 PROCEDURE — 2550000001 HC RX 255 CONTRASTS: Performed by: EMERGENCY MEDICINE

## 2025-02-24 PROCEDURE — 36415 COLL VENOUS BLD VENIPUNCTURE: CPT

## 2025-02-24 PROCEDURE — 71046 X-RAY EXAM CHEST 2 VIEWS: CPT | Performed by: RADIOLOGY

## 2025-02-24 PROCEDURE — 99285 EMERGENCY DEPT VISIT HI MDM: CPT | Mod: 25 | Performed by: EMERGENCY MEDICINE

## 2025-02-24 PROCEDURE — 85730 THROMBOPLASTIN TIME PARTIAL: CPT

## 2025-02-24 PROCEDURE — 96366 THER/PROPH/DIAG IV INF ADDON: CPT

## 2025-02-24 PROCEDURE — 2500000001 HC RX 250 WO HCPCS SELF ADMINISTERED DRUGS (ALT 637 FOR MEDICARE OP): Performed by: EMERGENCY MEDICINE

## 2025-02-24 PROCEDURE — 84484 ASSAY OF TROPONIN QUANT: CPT

## 2025-02-24 PROCEDURE — 2500000004 HC RX 250 GENERAL PHARMACY W/ HCPCS (ALT 636 FOR OP/ED)

## 2025-02-24 PROCEDURE — 96367 TX/PROPH/DG ADDL SEQ IV INF: CPT

## 2025-02-24 PROCEDURE — 83880 ASSAY OF NATRIURETIC PEPTIDE: CPT

## 2025-02-24 PROCEDURE — 71275 CT ANGIOGRAPHY CHEST: CPT

## 2025-02-24 PROCEDURE — 83735 ASSAY OF MAGNESIUM: CPT

## 2025-02-24 PROCEDURE — 96365 THER/PROPH/DIAG IV INF INIT: CPT | Mod: 59

## 2025-02-24 PROCEDURE — 80053 COMPREHEN METABOLIC PANEL: CPT

## 2025-02-24 PROCEDURE — 85025 COMPLETE CBC W/AUTO DIFF WBC: CPT

## 2025-02-24 PROCEDURE — 2500000004 HC RX 250 GENERAL PHARMACY W/ HCPCS (ALT 636 FOR OP/ED): Performed by: EMERGENCY MEDICINE

## 2025-02-24 PROCEDURE — 71046 X-RAY EXAM CHEST 2 VIEWS: CPT

## 2025-02-24 RX ORDER — IBUPROFEN 600 MG/1
600 TABLET ORAL EVERY 6 HOURS PRN
Qty: 28 TABLET | Refills: 0 | Status: SHIPPED | OUTPATIENT
Start: 2025-02-24 | End: 2025-03-03

## 2025-02-24 RX ORDER — MAGNESIUM SULFATE HEPTAHYDRATE 40 MG/ML
INJECTION, SOLUTION INTRAVENOUS
Status: COMPLETED
Start: 2025-02-24 | End: 2025-02-24

## 2025-02-24 RX ORDER — LANOLIN ALCOHOL/MO/W.PET/CERES
400 CREAM (GRAM) TOPICAL ONCE
Status: DISCONTINUED | OUTPATIENT
Start: 2025-02-24 | End: 2025-02-24

## 2025-02-24 RX ORDER — HEPARIN SODIUM 10000 [USP'U]/100ML
0-4500 INJECTION, SOLUTION INTRAVENOUS CONTINUOUS
Status: DISCONTINUED | OUTPATIENT
Start: 2025-02-24 | End: 2025-02-24

## 2025-02-24 RX ORDER — ACETAMINOPHEN 500 MG
1000 TABLET ORAL EVERY 6 HOURS PRN
Qty: 30 TABLET | Refills: 0 | Status: SHIPPED | OUTPATIENT
Start: 2025-02-24 | End: 2025-03-06

## 2025-02-24 RX ORDER — ACETAMINOPHEN 325 MG/1
650 TABLET ORAL ONCE
Status: COMPLETED | OUTPATIENT
Start: 2025-02-24 | End: 2025-02-24

## 2025-02-24 RX ORDER — GUAIFENESIN 600 MG/1
600 TABLET, EXTENDED RELEASE ORAL 2 TIMES DAILY
Qty: 120 TABLET | Refills: 0 | Status: SHIPPED | OUTPATIENT
Start: 2025-02-24 | End: 2026-02-24

## 2025-02-24 RX ORDER — MAGNESIUM SULFATE HEPTAHYDRATE 40 MG/ML
2 INJECTION, SOLUTION INTRAVENOUS ONCE
Status: COMPLETED | OUTPATIENT
Start: 2025-02-24 | End: 2025-02-24

## 2025-02-24 RX ADMIN — IOHEXOL 66 ML: 350 INJECTION, SOLUTION INTRAVENOUS at 17:57

## 2025-02-24 RX ADMIN — MAGNESIUM SULFATE HEPTAHYDRATE 2 G: 40 INJECTION, SOLUTION INTRAVENOUS at 15:22

## 2025-02-24 RX ADMIN — HEPARIN SODIUM 1500 UNITS/HR: 10000 INJECTION, SOLUTION INTRAVENOUS at 20:24

## 2025-02-24 RX ADMIN — ACETAMINOPHEN 650 MG: 325 TABLET ORAL at 15:22

## 2025-02-24 ASSESSMENT — LIFESTYLE VARIABLES
EVER HAD A DRINK FIRST THING IN THE MORNING TO STEADY YOUR NERVES TO GET RID OF A HANGOVER: NO
TOTAL SCORE: 0
EVER FELT BAD OR GUILTY ABOUT YOUR DRINKING: NO
HAVE PEOPLE ANNOYED YOU BY CRITICIZING YOUR DRINKING: NO
HAVE YOU EVER FELT YOU SHOULD CUT DOWN ON YOUR DRINKING: NO

## 2025-02-24 ASSESSMENT — PAIN - FUNCTIONAL ASSESSMENT
PAIN_FUNCTIONAL_ASSESSMENT: 0-10
PAIN_FUNCTIONAL_ASSESSMENT: 0-10

## 2025-02-24 ASSESSMENT — COLUMBIA-SUICIDE SEVERITY RATING SCALE - C-SSRS
6. HAVE YOU EVER DONE ANYTHING, STARTED TO DO ANYTHING, OR PREPARED TO DO ANYTHING TO END YOUR LIFE?: NO
1. IN THE PAST MONTH, HAVE YOU WISHED YOU WERE DEAD OR WISHED YOU COULD GO TO SLEEP AND NOT WAKE UP?: NO
2. HAVE YOU ACTUALLY HAD ANY THOUGHTS OF KILLING YOURSELF?: NO

## 2025-02-24 ASSESSMENT — PAIN SCALES - GENERAL
PAINLEVEL_OUTOF10: 0 - NO PAIN
PAINLEVEL_OUTOF10: 0 - NO PAIN

## 2025-02-24 NOTE — ED TRIAGE NOTES
60 yo female arrived to ED via [facility] from [facility] due to feeling short of breath. [facility] stated patient was 97% spo2 RA on arrival then place 3L NC which patient stated she feels better.

## 2025-02-24 NOTE — ED PROVIDER NOTES
History of Present Illness     History provided by: Patient  Limitations to History: None  External Records Reviewed with Brief Summary: None    HPI:  Anna Verma is a 59 y.o. female with PMH of schizophrenia and ESTEFANY who presents with several days of shortness of breath. She states that she started feeling short of breath a couple of days ago while at rest, and also starting experiencing some pain with inspiration. She also describes a mid chest pain that has been present since this time. Her chest pain is not associated with exertion. No radiation of her pain. She reports that she has been having nonproductive cough as well as congestion over the past month. She says that she has felt much better since being placed on oxygen upon arrival to the ED. She says in the past she has had similar symptoms that have also improved with oxygen. She endorses a recent cold but denies fever, chills, or sick contacts. Denies prior cardiac history. No headache, dizziness, vision changes, neck pain, neck stiffness, nausea, vomiting, or abdominal pain. No diarrhea or urinary symptoms. No numbness or tingling. No leg pain or leg swelling. She states that she has had times of waking up in the middle of the night feeling short of breath and sweating. She says in the past she has had a sleep study done for sleep apnea but does not remember the result. She states she has another study scheduled on March 20th. She states she takes medications for schizophrenia but does not recall the names, and vitamin D.  Patient notes that she is unable to take antitussives or Tessalon Perles secondary to her antipsychotics.  Denied trauma, falls, or injuries. No passing out. She denies any history of blood clots.      Physical Exam   Triage vitals:  T 36.6 °C (97.9 °F)  HR 98  /81  RR 16  O2 98 % Supplemental oxygen    General: awake, alert, oriented x 3, no acute distress. Resting calmly. No respiratory distress.   Head: normocephalic,  atraumatic  Eyes:  EOMI, no scleral icterus  Mouth: mucous membranes moist, no oral lesions identified  Neck: supple, full ROM intact, trachea is midline  CV: regular rate and rhythm, normal S1/S2, no murmurs/rubs/gallops  Resp: equal rise bilaterally, normal respiratory effort, CTAB, no wheezing, rhonchi, or rales.  ABD: soft, nontender, nondistended, no guarding or rigidity, normal bowel sounds, no peritoneal signs  Neuro: No focal deficits. Cranial nerves 2-12 intact. Strength 5/5 in upper and lower extremities bilaterally. Sensation intact to light touch throughout.   MSK: calves soft and nontender. No cyanosis, clubbing, or edema. Moves all extremities with good tone. No palpable deformities. Neurovascularly intact throughout.   Skin: warm, dry, and intact  Psych: Appropriate mood and affect. Denies suicidal or homicidal ideation. Denies hallucinations.       Medical Decision Making & ED Course   Medical Decision Makin y.o. female with PMH of schizophrenia and ESTEFANY who presents with several days of shortness of breath. In the Emergency Department, hospital records were reviewed and IV access was obtained.  The patient is afebrile with stable vital signs. The patient is in no respiratory distress, satting well on room air but was initially placed on oxygen via nasal cannula upon arrival to the ED for comfort that was able to be weaned off. Differential diagnosis is broad and includes but not limited to ACS, PE, CHF, pneumonia, viral process, infection, or electrolyte abnormality.   Will plan for cardiac workup including EKG, CBC w/ diff, CMP w/ Mg, BNP, viral testing for COVID / Flu A/B / RSV, CXR, and CT angio chest to evaluate for any possible PE. Patient was given a dose of tylenol for treatment of her symptoms. Troponin was negative. Magnesium was slightly low at 1.48 for which patient was given IV magnesium repletion. Please see ED course.       ----    Independent Result Review and Interpretation:  Relevant laboratory and radiographic results were reviewed and independently interpreted by myself.  As necessary, they are commented on in the ED Course.    Chronic conditions affecting the patient's care: As documented above in [facility]    The patient was discussed with the following consultants/services: None    Care Considerations: As documented above in [facility]    ED Course:  ED Course as of 02/25/25 1926   Mon Feb 24, 2025   1451 CBC and Auto Differential(!)  CBC without leukocytosis.  Mildly low platelets at 112. []   1509 Comprehensive Metabolic Panel(!)  Metabolic panel unremarkable. []   1509 Low clinical concern for ACS with unremarkable troponin and BNP. []      ED Course User Index  [MH] Joey Jose MD         Diagnoses as of 02/25/25 1926   Shortness of breath   COVID   Aneurysm (CMS-HCC)     Disposition   CXR with findings concerning for cardiomegaly and pulmonary edema.  On exam, the patient does not appear to be clinically fluid overloaded. No edema to her BLE. She remains satting well on room air. CT angio chest obtained to evaluate for PE as well as to further evaluate for any possible pneumonia.  Patient signed out to Dr. Mcclelland in stable condition at 3 PM pending remainder of lab work including viral swabs and CT PE.  Patient is pending the remainder of her ED course and final disposition at this time.     Procedures   Procedures    Patient seen and discussed with ED attending physician.    JOEY ROBLEDO MS3   Emergency Medicine    Joey Jose MD  Emergency medicine PGY 3     Joey Jose MD  Resident  02/25/25 1926       Janet Gutiérrez MD  02/26/25 1811

## 2025-02-25 NOTE — DISCHARGE INSTRUCTIONS
You were seen today due to concerns of shortness of breath.  Your oxygen is appropriate. You are COVID-positive.  You do not have a pulmonary embolism.  You need to follow-up with your primary doctor for repeat lung exam, take Tylenol Motrin as needed and return if you have any worsening symptoms

## 2025-03-20 ENCOUNTER — OFFICE VISIT (OUTPATIENT)
Dept: SLEEP MEDICINE | Facility: HOSPITAL | Age: 60
End: 2025-03-20
Payer: MEDICARE

## 2025-03-20 VITALS
OXYGEN SATURATION: 93 % | BODY MASS INDEX: 39.32 KG/M2 | HEART RATE: 75 BPM | WEIGHT: 215 LBS | SYSTOLIC BLOOD PRESSURE: 112 MMHG | DIASTOLIC BLOOD PRESSURE: 80 MMHG | TEMPERATURE: 97.1 F

## 2025-03-20 DIAGNOSIS — G47.30 SLEEP-RELATED BREATHING DISORDER: Primary | ICD-10-CM

## 2025-03-20 PROBLEM — G47.33 OSA ON CPAP: Status: ACTIVE | Noted: 2017-09-18

## 2025-03-20 PROBLEM — E03.9 ACQUIRED HYPOTHYROIDISM: Status: ACTIVE | Noted: 2017-10-16

## 2025-03-20 PROCEDURE — G2211 COMPLEX E/M VISIT ADD ON: HCPCS | Performed by: INTERNAL MEDICINE

## 2025-03-20 PROCEDURE — 99214 OFFICE O/P EST MOD 30 MIN: CPT | Mod: GC | Performed by: INTERNAL MEDICINE

## 2025-03-20 PROCEDURE — 99204 OFFICE O/P NEW MOD 45 MIN: CPT | Performed by: INTERNAL MEDICINE

## 2025-03-20 RX ORDER — RISPERIDONE 1 MG/1
1 TABLET ORAL
COMMUNITY
Start: 2024-11-30

## 2025-03-20 RX ORDER — DIVALPROEX SODIUM 250 MG/1
250 TABLET, DELAYED RELEASE ORAL 3 TIMES DAILY
COMMUNITY

## 2025-03-20 RX ORDER — TALC
1 POWDER (GRAM) TOPICAL NIGHTLY PRN
COMMUNITY

## 2025-03-20 RX ORDER — LEVOTHYROXINE SODIUM 100 UG/1
100 TABLET ORAL DAILY
COMMUNITY

## 2025-03-20 RX ORDER — LISINOPRIL 10 MG/1
10 TABLET ORAL
COMMUNITY

## 2025-03-20 ASSESSMENT — PAIN SCALES - GENERAL: PAINLEVEL_OUTOF10: 0-NO PAIN

## 2025-03-20 NOTE — PROGRESS NOTES
"     Patient: Anna Verma    [id number]  : 1965 -- AGE 59 y.o.    Provider:/ Tamir Gutierrez MD     Location/ [facility]   Service Date: 3/20/2025              [facility]  New Visit Note        HISTORY OF PRESENT ILLNESS     The patient's referring provider is: No ref. provider found    HISTORY OF PRESENT ILLNESS   Anna Verma is a 59 y.o. female who presents to [facility] for a sleep medicine evaluation with concerns of New Patient Visit.     Medical history significant for: Schizophrenia, hypothyroidism on levothyroxine    PAST SLEEP HISTORY    Patient has the following sleep-related diagnoses: Snoring, prior hx of ESTEFANY on CPAP     Prior sleep study results: None available    Previously seen at [facility] sleep medicine, most recently 2017:  \"Severe obstructive sleep apnea with an AHI of 46 on a sleep study obtained a year ago in 2016. She has since been on CPAP 14, objectively adherent to the intervention and deriving very significant benefits from its use. Benefits include a reduction in the Mackay score, facilitation of sleep, improved sleep quality, more refreshed in the morning. \"    Patient was hospitalized for AMS in 2024 - 2024:  \"Patient appears very somnolent on arrival. Snoring respirations noted.\"\"Encephalopathy: Broad differential. Following with neurology and psychiatry who are primarily managing this condition. Toxic encephalopathy in the differential given multiple medications that put patient at risk for medication induced encephalopathy. Mild elevation of lithium noted. \" Condition at discharge noted to be \"good\". EEG was noted to be unremarkable.    ED visit 2025 for SOB for several days, felt better on oxygen upon arrival to ED, was able to wean off prior to discharge. CXR with findings concerning for cardiomegaly and pulmonary edema.      CURRENT HISTORY    On today's visit, " "the patient reports:    Patient is feeling better lately, shares about recent ED visit, is scheduled to see  primary care on 3/25/25.    No sleep medicine care for 7-8 years, has not used CPAP for 7-8 years due to poor ambient air quality    Currently lives at a group home    Followed by psychiatrist at [facility]    Sleep schedule:  Usual Bedtime 7:30 PM    Sleep latency:  1-2 hours, but will usually sit in bed and \"think, day dream\" or watch TV for this time, denies struggling to fall asleep    Nighttime awakening: up to 3 times per night, can take up to 2 hours to resume sleep    Wake time : 5:30 AM, sometimes returns to sleep and sleeps in later    Total sleep time estimate: 9 hours/day    Naps: for 1-2 hours per day, not daily, around 3-4pm    May doze off during daytime while watching TV      Takes melatonin 3 mg at bedtime prn    Preferred sleeping position: Back and side    Sleep environment:  Bed partner :  No    Pets in bed :   No    Bed and sleep environment comfortable :   Yes    Breathing during sleep:   Snoring, witnessed apneas, gasping / choking, night sweats    Nocturia: Yes    AM headaches: No    Dry mouth: Yes    Restorative sleep: Variable    Movement during sleep:    RLS: Denies    \"I get cramps in my legs almost every day\"    Nocturnal leg movements: No    Bruxism: No    Sleep-related behaviors / Parasomnia:     Sleep-walking: No    Dream enactment: No    No other behaviors concerning for parasomnias    Hypersomnia:  Sleep paralysis: No    Hypnogogic or hypnopompic hallucinations: No    Cataplexy: No    Circadian rhythm disorder:    Shift work: No. Disabled.    Denies advanced or delayed sleep phases    Drowsy driving: No. Does not drive. Denies history of MVA or near misses due to drowsy driving.    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      bacitracin 500 unit/gram ointment Apply topically 2 times a day. 1 application to left knee skin 2 times a day until healed      benztropine (Cogentin) 1 mg tablet Take 1 tablet (1 mg) by mouth 2 times a day.      cholecalciferol (Vitamin D-3) 50 MCG (2000 UT) tablet Take 1 tablet (50 mcg) by mouth once daily.      guaiFENesin (Mucinex) 600 mg 12 hr tablet Take 1 tablet (600 mg) by mouth 2 times a day. Do not crush, chew, or split. 120 tablet 0    haloperidol decanoate (Haldol Decanoate) 50 mg/mL injection Inject 2 mL (100 mg) into the muscle every 28 (twenty-eight) days.      levothyroxine (Synthroid, Levoxyl) 125 mcg tablet Take 1 tablet (125 mcg) by mouth early in the morning.. Take on an empty stomach at the same time each day, either 30 to 60 minutes prior to breakfast      lithium 300 mg capsule Take 1 capsule (300 mg) by mouth 3 times daily (morning, midday, late afternoon).      magnesium oxide (Mag-Ox) 400 mg tablet Take 1 tablet (400 mg) by mouth once daily.      nystatin (Mycostatin) cream Apply topically to groin skin 3 times a day      olanzapine-samidorphan (Lybalvi) 10-10 mg tablet Take 1 tablet by mouth once daily at bedtime.      omeprazole (PriLOSEC) 20 mg DR capsule Take 1 capsule (20 mg) by mouth once daily. Do not crush or chew.      oxybutynin XL (Ditropan-XL) 10 mg 24 hr tablet Take 1 tablet (10 mg) by mouth once daily. Do not crush, chew, or split.      simvastatin (Zocor) 10 mg tablet Take 1 tablet (10 mg) by mouth once daily.       No current facility-administered medications for this visit.         PAST HISTORY     PAST MEDICAL  "HISTORY  She  has no past medical history on file.    PAST SURGICAL HISTORY:  No past surgical history on file.    FAMILY HISTORY  No family history on file.    SOCIAL HISTORY  She  has no history on file for tobacco use, alcohol use, and drug use. She currently lives at a group home.      PHYSICAL EXAM     VITAL SIGNS: /80   Pulse 75   Temp 36.2 °C (97.1 °F)   Wt 97.5 kg (215 lb)   SpO2 93%   BMI 39.32 kg/m²      CURRENT WEIGHT: [unfilled]  BMI: [unfilled]    PREVIOUS WEIGHTS:   Wt Readings from Last 12 Encounters:   02/24/25 81.6 kg (180 lb)   10/16/24 81.6 kg (180 lb)       Oropharynx:  Dentition present, no Retrognatia, mildly Deviated nasal septum, no Tongue scalloping noted,  Butler score 3, lateral wall narrowing noted  Nasal: turbinates normal  Neck: Supple.   Lungs: Good breath sounds. No wheezing, rales or rhonchi  HEART: Regular rate and rhythm, no murmurs  EXTREMITIES (LOWER): Moves all extremities  NEURO: No gross neurological deficit noted  PSYCH: Normal affect, engaged in interview, alert and oriented, logical and linear, coherent      RESULTS/DATA     No results found for: \"IRON\", \"TRANSFERRIN\", \"IRONSAT\", \"TIBC\", \"FERRITIN\"    Bicarbonate (mmol/L)   Date Value   02/24/2025 28   10/31/2024 25   10/16/2024 25           ASSESSMENT/PLAN     Ms. Verma is a 59 y.o. female with Medical history significant for: Schizophrenia (follows with outside psychiatrist) hypothyroidism on levothyroxine, who presents for initial sleep medicine evaluation upon referral to the [facility] for:    Obstructive Sleep Apnea -  Remains symptomatic, with snoring, witnessed apneas, gasping, nocturia and nighttime awakenings, unrefreshing sleep.  Prior sleep studies years ago, previously diagnosed with severe ESTEFANY with AHI of 46 on sleep study in 9/2016, previously on CPAP 14 cmH2O, with benefit, was seeing [facility] sleep medicine, has not used CPAP for many years, attributes this to poor air " quality where she resides.   - Split-night PSG ordered today: Discussed face to face with the patient about the need for a sleep study and possible need for PAP therapy if sleep apnea is diagnosed, including PAP therapy titration during sleep study if indicated. Patient agrees with this plan.     - Patient was cautioned not to drive or operate dangerous machinery while sleepy; pt does not currently drive  - Patient strongly advised to keep her appointment with primary care, upcoming on 3/25/25, to address  variety of ongoing issues and recent ED visits and hospitalizations  - May continue taking melatonin 3 mg at bedtime prn    Patient Education -  - Methods to improve sleep hygiene were discussed with patient  - Stimulus control was discussed with patient     FOLLOW UP in 3 months     Tamir Gutierrez MD (Sleep Medicine Fellow)    The patient was seen and discussed with attending physician Jeanine Sears MD who is in agreement with the assessment and plan as outlined above.

## 2025-03-20 NOTE — PATIENT INSTRUCTIONS
It was good to meet with you. We ordered a sleep study for you today; you will be contacted to schedule the study. If you don't hear from us in 1-2 weeks, please call us at the numbers below.    Below are our contact numbers:  If you need to schedule an appointment, please call [phone])  If you need general assistance (e.g. forms completed, general questions), please call [phone].  If you have a medical question about your sleep issues, please contact our nurses, Sherrell or Elvia at [phone].   You can also contact us through [place].

## 2025-03-20 NOTE — PROGRESS NOTES
Reviewed the prior history and current management for sleep issues.  Discussed the plan of a split night. Will need to have PCP initiate a workup for cardiopulmonary dysfunction. Doubt that there is present a hypoventilaiton syndrome.   Best to treat the ESTEFANY first and then increase the thyroid replacement because thyroid will increase oxygen consumption and in the presence of cointinued apneas and hypoxemia the tissue especially the myocardium, can be compromised.     I have personally seen the patient and reviewed the chart. I agree with the assessment and plan.    Jeanine Sears MD

## 2025-03-25 ENCOUNTER — APPOINTMENT (OUTPATIENT)
Dept: PRIMARY CARE | Facility: CLINIC | Age: 60
End: 2025-03-25
Payer: MEDICARE